# Patient Record
Sex: MALE
[De-identification: names, ages, dates, MRNs, and addresses within clinical notes are randomized per-mention and may not be internally consistent; named-entity substitution may affect disease eponyms.]

---

## 2018-07-24 ENCOUNTER — HOSPITAL ENCOUNTER (EMERGENCY)
Dept: HOSPITAL 14 - H.ER | Age: 38
Discharge: HOME | End: 2018-07-24
Payer: COMMERCIAL

## 2018-07-24 VITALS
RESPIRATION RATE: 18 BRPM | HEART RATE: 76 BPM | TEMPERATURE: 98 F | SYSTOLIC BLOOD PRESSURE: 140 MMHG | DIASTOLIC BLOOD PRESSURE: 69 MMHG | OXYGEN SATURATION: 100 %

## 2018-07-24 VITALS — BODY MASS INDEX: 25.7 KG/M2
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
## 2018-07-24 DIAGNOSIS — S83.91XA: Primary | ICD-10-CM
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
## 2018-07-24 NOTE — ED PDOC
Lower Extremity Pain/Injury


Time Seen by Provider: 07/24/18 10:06


History Per: Patient


Onset/Duration Of Symptoms: Days (1)


Current Symptoms Are (Timing): Still Present


Severity: Moderate


Pain Scale Rating Of: 5


Additional Complaint(s): 





Right knee pain since last night while bending knee, felt pop and has been 

unable to bear weight due to pain. Previous injury with MRI 7/9 revealing small 

cartiladge tear. Seen by Dr. Bird and tx'ed conservatively.





Past Medical History


Vital Signs: 





 Last Vital Signs











Temp  98.6 F   07/24/18 09:57


 


Pulse  78   07/24/18 09:57


 


Resp  20   07/24/18 09:57


 


BP  141/86   07/24/18 09:57


 


Pulse Ox  98   07/24/18 09:57














- Medical History


PMH: No Chronic Diseases





- Family History


Family History: States: Unknown Family Hx





- Home Medications


Home Medications: 


 Ambulatory Orders











 Medication  Instructions  Recorded


 


traMADol [Ultram] 50 mg PO Q8 #10 tab 07/24/18














- Allergies


Allergies/Adverse Reactions: 


 Allergies











Allergy/AdvReac Type Severity Reaction Status Date / Time


 


No Known Allergies Allergy   Verified 07/24/18 10:11














Review of Systems


Musculoskeletal: Positive for: Other (Knee pain)


Neurological: Negative for: Weakness, Numbness





Physical Exam





- Physical Exam


Appears: Positive for: Non-toxic, No Acute Distress


Extremity: Positive for: Other (Right knne, no swelling or erythema. No 

ibstability. Pain on flexion. No crepiatance.)





- ECG


O2 Sat by Pulse Oximetry: 98





Medical Decision Making


Medical Decision Making: 





Disccussed with Dr. Bird. Knee immobilizer, crutches nonwt bearing, pain meds 

and will see in office.





Disposition





- Clinical Impression


Clinical Impression: 


 Knee sprain








- Patient ED Disposition


Is Patient to be Admitted: No


Counseled Patient/Family Regarding: Diagnosis, Need For Followup, Rx Given





- Disposition


Referrals: 


Eris Bird MD [Medical Doctor] - 


Disposition: Routine/Home


Disposition Time: 10:18


Condition: FAIR


Prescriptions: 


traMADol [Ultram] 50 mg PO Q8 #10 tab


Instructions:  Knee Sprain (DC)